# Patient Record
Sex: FEMALE | Race: WHITE | NOT HISPANIC OR LATINO | Employment: UNEMPLOYED | URBAN - METROPOLITAN AREA
[De-identification: names, ages, dates, MRNs, and addresses within clinical notes are randomized per-mention and may not be internally consistent; named-entity substitution may affect disease eponyms.]

---

## 2022-04-11 ENCOUNTER — HISTORICAL (OUTPATIENT)
Dept: ADMINISTRATIVE | Facility: HOSPITAL | Age: 27
End: 2022-04-11

## 2022-04-27 VITALS
HEIGHT: 60 IN | WEIGHT: 128.75 LBS | BODY MASS INDEX: 25.28 KG/M2 | DIASTOLIC BLOOD PRESSURE: 60 MMHG | OXYGEN SATURATION: 96 % | SYSTOLIC BLOOD PRESSURE: 97 MMHG

## 2022-07-27 ENCOUNTER — HOSPITAL ENCOUNTER (EMERGENCY)
Facility: HOSPITAL | Age: 27
Discharge: HOME | End: 2022-07-27
Attending: EMERGENCY MEDICINE
Payer: MEDICAID

## 2022-07-27 VITALS
SYSTOLIC BLOOD PRESSURE: 105 MMHG | TEMPERATURE: 98 F | OXYGEN SATURATION: 99 % | RESPIRATION RATE: 18 BRPM | DIASTOLIC BLOOD PRESSURE: 69 MMHG | HEART RATE: 73 BPM

## 2022-07-27 DIAGNOSIS — F31.9 BIPOLAR AFFECTIVE DISORDER, REMISSION STATUS UNSPECIFIED: Primary | ICD-10-CM

## 2022-07-27 DIAGNOSIS — F19.10 POLYSUBSTANCE ABUSE: ICD-10-CM

## 2022-07-27 PROCEDURE — 99281 EMR DPT VST MAYX REQ PHY/QHP: CPT

## 2022-07-27 NOTE — ED PROVIDER NOTES
Encounter Date: 7/27/2022       History     Chief Complaint   Patient presents with    Psychiatric Evaluation     Pt brought in by [employer] officer states that pt was found dancing [place] side of the [address] states that pt expressed SI was brought in for psych eval      This 28 y/o female with h/o Bipolar disorder possibly schizophrenia and methamphetamine abuse was picked up dancing in the street. She appears quite unkempt. She appears to be tweaking but has calmed significantly. She is tangential at times but does give clear and direct answers. She denies being suicidal. She states she does not like getting locked in psychiatric hospitals and that she thinks we will come to realize that they are not helping her. She states methamphetamines used to help her think more clearly but they do not any more.        Review of patient's allergies indicates:   Allergen Reactions    Latex     Penicillins      No past medical history on file.  No past surgical history on file.  No family history on file.  Social History     Tobacco Use    Smoking status: Current Every Day Smoker    Smokeless tobacco: Never Used   Substance Use Topics    Alcohol use: Yes     Review of Systems   Constitutional: Negative for fever.   HENT: Negative for sore throat.    Respiratory: Negative for shortness of breath.    Cardiovascular: Negative for chest pain.   Gastrointestinal: Negative for nausea.   Genitourinary: Negative for dysuria.   Musculoskeletal: Negative for back pain.   Skin: Negative for rash.   Neurological: Negative for weakness.   Hematological: Does not bruise/bleed easily.       Physical Exam     Initial Vitals [07/27/22 1153]   BP Pulse Resp Temp SpO2   105/69 73 18 98 °F (36.7 °C) 99 %      MAP       --         Physical Exam    Nursing note and vitals reviewed.  Constitutional: She appears well-developed and well-nourished.   HENT:   Head: Normocephalic and atraumatic.   Eyes: Conjunctivae and EOM are normal. Pupils  are equal, round, and reactive to light.   Neck: Neck supple.   Normal range of motion.  Cardiovascular: Normal rate, regular rhythm, normal heart sounds and intact distal pulses.   Pulmonary/Chest: Breath sounds normal.   Abdominal: Abdomen is soft. Bowel sounds are normal.   Musculoskeletal:         General: Normal range of motion.      Cervical back: Normal range of motion and neck supple.     Neurological: She is alert and oriented to person, place, and time. She has normal strength.   Skin: Skin is warm and dry. Capillary refill takes less than 2 seconds.   Psychiatric: She has a normal mood and affect. Her behavior is normal. Thought content normal.         ED Course   Procedures  Labs Reviewed - No data to display       Imaging Results    None          Medications - No data to display                       Clinical Impression:   Final diagnoses:  [F31.9] Bipolar affective disorder, remission status unspecified (Primary)  [F19.10] Polysubstance abuse          ED Disposition Condition    Discharge Stable        ED Prescriptions     None        Follow-up Information    None          Mook Bryant MD  07/27/22 7144

## 2022-07-27 NOTE — ED NOTES
Pt was able to give me the phone number of her Aunt Renetta ([phone]) Contacted her by phone, states she will be coming to pick pt up.

## 2022-11-23 ENCOUNTER — TELEPHONE (OUTPATIENT)
Dept: GYNECOLOGY | Facility: CLINIC | Age: 27
End: 2022-11-23
Payer: MEDICAID

## 2022-11-23 NOTE — TELEPHONE ENCOUNTER
----- Message from Lena Mc sent at 11/23/2022  2:26 PM CST -----  Regarding: issues with Mirena  Above pt is calling because she is having issues with her Mirena and wants to remove it. She states she thinks it has moved and she is bleeding bright red. She did go to the ER and was told to follow up with GYN. Please Advise Thanks   Called patient, no answer, mailbox full.  Patient has not seen us since 2016.  she will need a new referral.

## 2022-11-25 ENCOUNTER — TELEPHONE (OUTPATIENT)
Dept: GYNECOLOGY | Facility: CLINIC | Age: 27
End: 2022-11-25
Payer: MEDICAID

## 2022-11-25 NOTE — TELEPHONE ENCOUNTER
----- Message from Zulma Moreira RN sent at 11/23/2022  3:02 PM CST -----  Regarding: FW: issues with Mirena  Called patient, no answer, mailbox full.  Patient is going to need a new referral, she hasn't been seen in GYN clinic since 2016  ----- Message -----  From: Lena Mc  Sent: 11/23/2022   2:28 PM CST  To: [facility] Clinical Support Staff  Subject: issues with Mirena                               Above pt is calling because she is having issues with her Mirena and wants to remove it. She states she thinks it has moved and she is bleeding bright red. She did go to the ER and was told to follow up with GYN. Please Advise Thanks     Called , no answer, mailbox is full

## 2022-11-28 ENCOUNTER — TELEPHONE (OUTPATIENT)
Dept: GYNECOLOGY | Facility: CLINIC | Age: 27
End: 2022-11-28
Payer: MEDICAID

## 2022-11-29 ENCOUNTER — TELEPHONE (OUTPATIENT)
Dept: GYNECOLOGY | Facility: CLINIC | Age: 27
End: 2022-11-29
Payer: MEDICAID

## 2022-11-29 NOTE — TELEPHONE ENCOUNTER
----- Message from Zulma Moreira RN sent at 11/28/2022  8:30 AM CST -----  Regarding: FW: issues with Mirena  Called a second time today, no answer, mailbox full  ----- Message -----  From: Zulma Moreira RN  Sent: 11/25/2022   2:56 PM CST  To: [facility] Clinical Support Staff  Subject: FW: issues with Mirena                           Telephone encounter created again (#2) no answer, mailbox full  ----- Message -----  From: Zulma Moreira RN  Sent: 11/23/2022   3:03 PM CST  To: [facility] Clinical Support Staff  Subject: FW: issues with Mirena                           Called patient, no answer, mailbox full.  Patient is going to need a new referral, she hasn't been seen in GYN clinic since 2016  ----- Message -----  From: Lena Mc  Sent: 11/23/2022   2:28 PM CST  To: [facility] Clinical Support Staff  Subject: issues with Mirena                               Above pt is calling because she is having issues with her Mirena and wants to remove it. She states she thinks it has moved and she is bleeding bright red. She did go to the ER and was told to follow up with GYN. Please Advise Thanks     Called a 3rd time, no answer, voicemail box full....